# Patient Record
Sex: FEMALE | Race: WHITE | Employment: FULL TIME | URBAN - METROPOLITAN AREA
[De-identification: names, ages, dates, MRNs, and addresses within clinical notes are randomized per-mention and may not be internally consistent; named-entity substitution may affect disease eponyms.]

---

## 2017-10-13 ENCOUNTER — OFFICE VISIT (OUTPATIENT)
Dept: FAMILY MEDICINE CLINIC | Age: 27
End: 2017-10-13

## 2017-10-13 VITALS
WEIGHT: 214.4 LBS | HEART RATE: 88 BPM | DIASTOLIC BLOOD PRESSURE: 62 MMHG | OXYGEN SATURATION: 98 % | TEMPERATURE: 97.2 F | BODY MASS INDEX: 37.99 KG/M2 | SYSTOLIC BLOOD PRESSURE: 122 MMHG | HEIGHT: 63 IN

## 2017-10-13 DIAGNOSIS — R41.89 DISORGANIZED THOUGHT PROCESS: ICD-10-CM

## 2017-10-13 DIAGNOSIS — Z76.89 ESTABLISHING CARE WITH NEW DOCTOR, ENCOUNTER FOR: ICD-10-CM

## 2017-10-13 DIAGNOSIS — R45.89 DEPRESSED MOOD: ICD-10-CM

## 2017-10-13 DIAGNOSIS — G47.8 NON-RESTORATIVE SLEEP: ICD-10-CM

## 2017-10-13 DIAGNOSIS — R53.83 FATIGUE, UNSPECIFIED TYPE: Primary | ICD-10-CM

## 2017-10-13 LAB
BASOPHILS ABSOLUTE: 0.1 K/UL (ref 0–0.2)
BASOPHILS RELATIVE PERCENT: 0.6 %
EOSINOPHILS ABSOLUTE: 0.2 K/UL (ref 0–0.6)
EOSINOPHILS RELATIVE PERCENT: 1.7 %
HCT VFR BLD CALC: 42.7 % (ref 36–48)
HEMOGLOBIN: 14.7 G/DL (ref 12–16)
LYMPHOCYTES ABSOLUTE: 3 K/UL (ref 1–5.1)
LYMPHOCYTES RELATIVE PERCENT: 30.8 %
MCH RBC QN AUTO: 31.4 PG (ref 26–34)
MCHC RBC AUTO-ENTMCNC: 34.4 G/DL (ref 31–36)
MCV RBC AUTO: 91.3 FL (ref 80–100)
MONOCYTES ABSOLUTE: 0.6 K/UL (ref 0–1.3)
MONOCYTES RELATIVE PERCENT: 6.2 %
NEUTROPHILS ABSOLUTE: 5.9 K/UL (ref 1.7–7.7)
NEUTROPHILS RELATIVE PERCENT: 60.7 %
PDW BLD-RTO: 13.6 % (ref 12.4–15.4)
PLATELET # BLD: 296 K/UL (ref 135–450)
PMV BLD AUTO: 8.8 FL (ref 5–10.5)
RBC # BLD: 4.67 M/UL (ref 4–5.2)
WBC # BLD: 9.7 K/UL (ref 4–11)

## 2017-10-13 PROCEDURE — 99203 OFFICE O/P NEW LOW 30 MIN: CPT | Performed by: FAMILY MEDICINE

## 2017-10-13 PROCEDURE — 36415 COLL VENOUS BLD VENIPUNCTURE: CPT | Performed by: FAMILY MEDICINE

## 2017-10-13 PROCEDURE — G0444 DEPRESSION SCREEN ANNUAL: HCPCS | Performed by: FAMILY MEDICINE

## 2017-10-13 RX ORDER — ACETAMINOPHEN 500 MG
650 TABLET ORAL
COMMUNITY

## 2017-10-13 ASSESSMENT — ENCOUNTER SYMPTOMS
SHORTNESS OF BREATH: 0
NAUSEA: 0
DIARRHEA: 0
COUGH: 0
ABDOMINAL PAIN: 0
VOMITING: 0

## 2017-10-13 ASSESSMENT — PATIENT HEALTH QUESTIONNAIRE - PHQ9
3. TROUBLE FALLING OR STAYING ASLEEP: 2
1. LITTLE INTEREST OR PLEASURE IN DOING THINGS: 1
SUM OF ALL RESPONSES TO PHQ QUESTIONS 1-9: 12
10. IF YOU CHECKED OFF ANY PROBLEMS, HOW DIFFICULT HAVE THESE PROBLEMS MADE IT FOR YOU TO DO YOUR WORK, TAKE CARE OF THINGS AT HOME, OR GET ALONG WITH OTHER PEOPLE: 0
9. THOUGHTS THAT YOU WOULD BE BETTER OFF DEAD, OR OF HURTING YOURSELF: 0
SUM OF ALL RESPONSES TO PHQ9 QUESTIONS 1 & 2: 3
2. FEELING DOWN, DEPRESSED OR HOPELESS: 2
5. POOR APPETITE OR OVEREATING: 1
7. TROUBLE CONCENTRATING ON THINGS, SUCH AS READING THE NEWSPAPER OR WATCHING TELEVISION: 2
6. FEELING BAD ABOUT YOURSELF - OR THAT YOU ARE A FAILURE OR HAVE LET YOURSELF OR YOUR FAMILY DOWN: 0
4. FEELING TIRED OR HAVING LITTLE ENERGY: 3
8. MOVING OR SPEAKING SO SLOWLY THAT OTHER PEOPLE COULD HAVE NOTICED. OR THE OPPOSITE, BEING SO FIGETY OR RESTLESS THAT YOU HAVE BEEN MOVING AROUND A LOT MORE THAN USUAL: 1

## 2017-10-13 NOTE — PATIENT INSTRUCTIONS
Relaxing Deep Breath technique: Do this 2 times every day and whenever you need it. Place your tongue on the roof of your mouth just behind your front teeth. Breath in through your nose for a count of 4. Then hold your breath for a count of 7, and exhale through your mouth with your tongue still on the roof of your mouth for a count of 8. Do this for 4 breath cycles each time.       Reduce your caffeine intake

## 2017-10-13 NOTE — PROGRESS NOTES
Subjective:      Patient ID: Johnny Mullins is a 33 y.o. female. Clint Aguilera is here to establish care. She has concerns about her mood, possible thyroid. Mood: For the past several months, she has had some brain fog, fatigue, forgetfulness. Nothing has changed. Her sleep habits are poor and she does not feel rested when she wakes. She has a history of hypothyroidism, labs last checked 2 years ago. Review of Systems   Constitutional: Negative for fever and unexpected weight change. HENT: Negative for hearing loss. Eyes: Negative for visual disturbance. Respiratory: Negative for cough and shortness of breath. Cardiovascular: Negative for chest pain, palpitations and leg swelling. Gastrointestinal: Negative for abdominal pain, diarrhea, nausea and vomiting. Endocrine: Positive for cold intolerance. Genitourinary: Negative for dysuria and hematuria. Skin: Negative for rash and wound. Neurological: Positive for dizziness, light-headedness and headaches. Negative for weakness. Hematological: Negative for adenopathy. Does not bruise/bleed easily. Psychiatric/Behavioral: Positive for sleep disturbance. The patient is nervous/anxious. Objective:   Physical Exam   Constitutional: She is oriented to person, place, and time. She appears well-developed and well-nourished. No distress. HENT:   Head: Normocephalic and atraumatic. Right Ear: Tympanic membrane, external ear and ear canal normal.   Left Ear: Tympanic membrane, external ear and ear canal normal.   Mouth/Throat: Oropharynx is clear and moist and mucous membranes are normal. No oropharyngeal exudate or posterior oropharyngeal erythema. Eyes: Conjunctivae and EOM are normal. Pupils are equal, round, and reactive to light. Neck: Normal range of motion. Neck supple. No thyromegaly present. Cardiovascular: Normal rate and regular rhythm. No murmur heard.   Pulmonary/Chest: Effort normal and breath sounds normal. She has no wheezes. She has no rales. Abdominal: Soft. Bowel sounds are normal. She exhibits no mass. There is no tenderness. Musculoskeletal: She exhibits no edema. Lymphadenopathy:     She has no cervical adenopathy. Neurological: She is alert and oriented to person, place, and time. Psychiatric: She has a normal mood and affect. Vitals reviewed. Assessment:      1. Fatigue, unspecified type  T4, FREE    TSH without Reflex    Comprehensive Metabolic Panel, Fasting    CBC WITH AUTO DIFFERENTIAL   2. Non-restorative sleep     3. Depressed mood  T4, FREE    TSH without Reflex    Comprehensive Metabolic Panel, Fasting    CBC WITH AUTO DIFFERENTIAL   4. Disorganized thought process  T4, FREE    TSH without Reflex    Comprehensive Metabolic Panel, Fasting    CBC WITH AUTO DIFFERENTIAL   5. Establishing care with new doctor, encounter for            Plan:      1-4. This is most likely a multifaceted problem. She is getting too much caffeine and not enough rest. She is in a high stress career. With her history of thyroid issues, will need to check some labs. Will check CBC, CMP, TSH/FT4 to start. She will start reducing her caffeine intake - as aggressively as she can tolerate it. She will start the Relaxing Deep Breath. Relaxing Deep Breath technique: Do this 2 times every day and whenever you need it. Place your tongue on the roof of your mouth just behind your front teeth. Breath in through your nose for a count of 4. Then hold your breath for a count of 7, and exhale through your mouth with your tongue still on the roof of your mouth for a count of 8. Do this for 4 breath cycles each time. 5. Visit to establish care.      Follow up 2 weeks: labs, sleep, stress, fatigue

## 2017-10-14 LAB
A/G RATIO: 1.8 (ref 1.1–2.2)
ALBUMIN SERPL-MCNC: 4.6 G/DL (ref 3.4–5)
ALP BLD-CCNC: 53 U/L (ref 40–129)
ALT SERPL-CCNC: 17 U/L (ref 10–40)
ANION GAP SERPL CALCULATED.3IONS-SCNC: 16 MMOL/L (ref 3–16)
AST SERPL-CCNC: 12 U/L (ref 15–37)
BILIRUB SERPL-MCNC: <0.2 MG/DL (ref 0–1)
BUN BLDV-MCNC: 11 MG/DL (ref 7–20)
CALCIUM SERPL-MCNC: 9.6 MG/DL (ref 8.3–10.6)
CHLORIDE BLD-SCNC: 101 MMOL/L (ref 99–110)
CO2: 22 MMOL/L (ref 21–32)
CREAT SERPL-MCNC: 0.5 MG/DL (ref 0.6–1.1)
GFR AFRICAN AMERICAN: >60
GFR NON-AFRICAN AMERICAN: >60
GLOBULIN: 2.6 G/DL
GLUCOSE FASTING: 92 MG/DL (ref 70–99)
POTASSIUM SERPL-SCNC: 4.1 MMOL/L (ref 3.5–5.1)
SODIUM BLD-SCNC: 139 MMOL/L (ref 136–145)
T4 FREE: 1.1 NG/DL (ref 0.9–1.8)
TOTAL PROTEIN: 7.2 G/DL (ref 6.4–8.2)
TSH SERPL DL<=0.05 MIU/L-ACNC: 1.41 UIU/ML (ref 0.27–4.2)

## 2017-10-27 ENCOUNTER — OFFICE VISIT (OUTPATIENT)
Dept: FAMILY MEDICINE CLINIC | Age: 27
End: 2017-10-27

## 2017-10-27 VITALS
HEART RATE: 90 BPM | DIASTOLIC BLOOD PRESSURE: 70 MMHG | SYSTOLIC BLOOD PRESSURE: 118 MMHG | BODY MASS INDEX: 38.05 KG/M2 | OXYGEN SATURATION: 98 % | TEMPERATURE: 96.8 F | WEIGHT: 214.8 LBS

## 2017-10-27 DIAGNOSIS — Z13.31 POSITIVE DEPRESSION SCREENING: ICD-10-CM

## 2017-10-27 DIAGNOSIS — R53.83 OTHER FATIGUE: ICD-10-CM

## 2017-10-27 DIAGNOSIS — R20.0 NUMBNESS AND TINGLING IN LEFT ARM: ICD-10-CM

## 2017-10-27 DIAGNOSIS — R20.2 NUMBNESS AND TINGLING IN LEFT ARM: ICD-10-CM

## 2017-10-27 DIAGNOSIS — R45.89 DEPRESSED MOOD: ICD-10-CM

## 2017-10-27 DIAGNOSIS — M79.602 LEFT ARM PAIN: ICD-10-CM

## 2017-10-27 DIAGNOSIS — G47.8 NON-RESTORATIVE SLEEP: Primary | ICD-10-CM

## 2017-10-27 PROCEDURE — G8484 FLU IMMUNIZE NO ADMIN: HCPCS | Performed by: FAMILY MEDICINE

## 2017-10-27 PROCEDURE — 1036F TOBACCO NON-USER: CPT | Performed by: FAMILY MEDICINE

## 2017-10-27 PROCEDURE — G8427 DOCREV CUR MEDS BY ELIG CLIN: HCPCS | Performed by: FAMILY MEDICINE

## 2017-10-27 PROCEDURE — 99214 OFFICE O/P EST MOD 30 MIN: CPT | Performed by: FAMILY MEDICINE

## 2017-10-27 PROCEDURE — G8431 POS CLIN DEPRES SCRN F/U DOC: HCPCS | Performed by: FAMILY MEDICINE

## 2017-10-27 PROCEDURE — G8417 CALC BMI ABV UP PARAM F/U: HCPCS | Performed by: FAMILY MEDICINE

## 2017-10-27 NOTE — PROGRESS NOTES
Subjective:      Patient ID: Helen De Oliveira is a 33 y.o. female. Velasquez Flowers is here to follow up on her labs, fatigue, and depressed mood. She also has left arm issues. Labs:  CMP, CBC and thyroid studies were all normal. Results reviewed with patient. Fatigue:  No major change. She is getting more regular sleep, and that is helping some. Depressed mood: This has improved some. She is sleeping better. She is doing the Relaxing Deep Breath at least once a day. She has decreased her caffeine. Arm  Left arm pain, numbness/tingling. Comes and goes, but bothers her most days. She repositions her self, looking down and to the right, that helps. She also leans to the right. No injury to start this. Review of Systems   Constitutional: Positive for fatigue. Musculoskeletal:        Left arm pain   Neurological: Positive for numbness. Negative for weakness. Objective:   Physical Exam   Constitutional: She is oriented to person, place, and time. She appears well-developed and well-nourished. No distress. Musculoskeletal:        Cervical back: She exhibits deformity (decreased muscle size left upper back). She exhibits normal range of motion, no tenderness and no bony tenderness. Left upper arm: Normal.        Left forearm: Normal.        Left hand: Normal.   Left arm strength decreased at 4/5 for , biceps, triceps, internal and external rotation, deltoid. Neurological: She is alert and oriented to person, place, and time. Psychiatric: She has a normal mood and affect. Nursing note and vitals reviewed. Assessment:      1. Non-restorative sleep     2. Other fatigue     3. Depressed mood     4. Positive depression screening  Positive Screen for Clinical Depression with a Documented Follow-up Plan    5. Left arm pain  X-ray cervical spine AP lateral and odontoid   6. Numbness and tingling in left arm  X-ray cervical spine AP lateral and odontoid          Plan:      1 & 2.  With normal labs, will continue the lifestyle modifications. She will continue to reduce her caffeine. Her goal is to reduce by half, a little more than that would be great. Goal - Regular wake time. Goal - Relaxing Deep breath at least 2 times a day. If she does these things and still is having non-restorative sleep, will get a sleep study. 3 & 4. On the basis of positive PHQ-9 screening ( ), the following plan was implemented: Lifestyle changes above, improve sleep, Relaxing Deep breath. Patient will follow-up in 2 months with me.  5 & 6. Check x ray of cervical spine. The repositioning she is doing is helping open the left sided foramen, so we will look at them. She already has lumbar spine issues, so it is more likely at her age that she also has cervical spine issues. She will start a B complex vitamin with folate and take daily. I also instructed her in chin tuck for symptom relief. Follow up 2 months for mood, fatigue, sooner pending x rays.

## 2017-11-06 ENCOUNTER — TELEPHONE (OUTPATIENT)
Dept: FAMILY MEDICINE CLINIC | Age: 27
End: 2017-11-06

## 2017-11-06 NOTE — TELEPHONE ENCOUNTER
X ray of cervical spine shows mild disc space disease and reversal of normal cervical spine curvature. This change of curvature most likely represents muscle spasm. She should apply warm moist heat, do ROM exercises and allow some time.  If that does not help, the next step is PT.

## 2018-02-12 ENCOUNTER — OFFICE VISIT (OUTPATIENT)
Dept: FAMILY MEDICINE CLINIC | Age: 28
End: 2018-02-12

## 2018-02-12 VITALS
OXYGEN SATURATION: 98 % | TEMPERATURE: 96.4 F | DIASTOLIC BLOOD PRESSURE: 62 MMHG | WEIGHT: 213.6 LBS | HEART RATE: 82 BPM | BODY MASS INDEX: 37.84 KG/M2 | SYSTOLIC BLOOD PRESSURE: 122 MMHG

## 2018-02-12 DIAGNOSIS — R10.13 EPIGASTRIC ABDOMINAL PAIN: Primary | ICD-10-CM

## 2018-02-12 PROCEDURE — G8417 CALC BMI ABV UP PARAM F/U: HCPCS | Performed by: FAMILY MEDICINE

## 2018-02-12 PROCEDURE — 99213 OFFICE O/P EST LOW 20 MIN: CPT | Performed by: FAMILY MEDICINE

## 2018-02-12 PROCEDURE — G8427 DOCREV CUR MEDS BY ELIG CLIN: HCPCS | Performed by: FAMILY MEDICINE

## 2018-02-12 PROCEDURE — G8484 FLU IMMUNIZE NO ADMIN: HCPCS | Performed by: FAMILY MEDICINE

## 2018-02-12 PROCEDURE — 1036F TOBACCO NON-USER: CPT | Performed by: FAMILY MEDICINE

## 2018-02-12 RX ORDER — OMEPRAZOLE 20 MG/1
20 CAPSULE, DELAYED RELEASE ORAL DAILY
Qty: 30 CAPSULE | Refills: 1 | Status: SHIPPED | OUTPATIENT
Start: 2018-02-12

## 2018-02-12 ASSESSMENT — ENCOUNTER SYMPTOMS
ABDOMINAL PAIN: 1
CONSTIPATION: 0
DIARRHEA: 0
BLOATING: 1

## 2018-02-12 NOTE — PROGRESS NOTES
Subjective:      Patient ID: Jose Miguel Ceballos is a 33 y.o. female. GI Problem   The primary symptoms include abdominal pain (epigastric). Primary symptoms do not include fever or diarrhea (but increase in BMs). Episode onset: 3 weeks ago. The illness is also significant for bloating. The illness does not include anorexia or constipation. Review of Systems   Constitutional: Negative for fever. Gastrointestinal: Positive for abdominal pain (epigastric) and bloating. Negative for anorexia, constipation and diarrhea (but increase in BMs). Past Medical History:   Diagnosis Date    Allergic rhinitis     Asthma       Social History     Social History    Marital status:      Spouse name: N/A    Number of children: N/A    Years of education: N/A     Occupational History    Not on file. Social History Main Topics    Smoking status: Former Smoker     Quit date: 7/13/2017    Smokeless tobacco: Never Used    Alcohol use No    Drug use: No    Sexual activity: Not on file     Other Topics Concern    Not on file     Social History Narrative    Sleep:  Hours of sleep 4-8      Bed Time 12-1am      Wake Time 7 am for work.  10 am if not working      Number of times waking 0      Reason:      Restful sleep No      Sleep Aids rarely        Diet:  Vegetables Yes Servings per day 1-2     Fruit  No  Servings per day      Dairy  Yes  Servings per day 1-2     Protein Yes  Servings per day 3  Source: animal     Nuts No   Servings per week      Legumes Yes  Servings per week 1     Processed foods Yes Servings per week 2-4           Water Yes  Glasses per day 48-64 oz per day     Caffeine Yes  Servings per day 4-5  Type coffee        Exercise: Regular Yes       Type jog on treadmil         Duration 30 minutes       Frequency 1-2 days per week           Current Outpatient Prescriptions:     omeprazole (PRILOSEC) 20 MG delayed release capsule, Take 1 capsule by mouth daily, Disp: 30 capsule, Rfl: 1   acetaminophen (TYLENOL) 500 MG tablet, Take 650 mg by mouth, Disp: , Rfl:       Objective:   Physical Exam   Constitutional: She is oriented to person, place, and time. She appears well-developed and well-nourished. No distress. Abdominal: Soft. Bowel sounds are normal. She exhibits no mass. There is no hepatosplenomegaly. There is tenderness in the epigastric area. There is no rigidity, no rebound and no guarding. Neurological: She is alert and oriented to person, place, and time. Psychiatric: She has a normal mood and affect. Nursing note and vitals reviewed. Assessment:      1. Epigastric abdominal pain             Plan:      1. Based on history and exam I believe she has a gastric or duodenal ulcer. She was advised to remain off of NSAIDs. We'll treat her with omeprazole or 6 weeks. Once a day. If she is not making any improvement in one week she will notify me. Avoid foods that agitate or create her pain. Avoid spicy and asked to be foods. Reduce caffeine. Follow-up pending response to meds.

## 2018-06-22 ENCOUNTER — HOSPITAL ENCOUNTER (OUTPATIENT)
Dept: OTHER | Age: 28
Discharge: HOME | End: 2018-06-22
Attending: PREVENTIVE MEDICINE | Admitting: PREVENTIVE MEDICINE

## 2018-06-25 LAB
NIL (NEGATIVE) SPOT CONTROL: NORMAL
PANEL A SPOT COUNT: 0
PANEL B SPOT COUNT: 0
POSITIVE CONTROL SPOT COUNT: NORMAL
TB CELL IMMUNE MEASURE: NEGATIVE